# Patient Record
Sex: FEMALE | Race: WHITE | NOT HISPANIC OR LATINO | Employment: UNEMPLOYED | URBAN - METROPOLITAN AREA
[De-identification: names, ages, dates, MRNs, and addresses within clinical notes are randomized per-mention and may not be internally consistent; named-entity substitution may affect disease eponyms.]

---

## 2020-11-27 LAB
HPV ABSTRACT: NORMAL
PAP-ABSTRACT: NORMAL

## 2021-03-23 LAB
CHOLESTEROL (EXTERNAL): 182 MG/DL (ref 0–199)
HDLC SERPL-MCNC: 62 MG/DL
LDL CHOLESTEROL CALCULATED (EXTERNAL): 97 MG/DL
NON HDL CHOLESTEROL (EXTERNAL): 120 MG/DL
TRIGLYCERIDES (EXTERNAL): 116 MG/DL

## 2022-02-15 LAB
ALT SERPL-CCNC: 143 U/L (ref 0–55)
AST SERPL-CCNC: 142 U/L (ref 10–40)
HEP C HIM: NORMAL
TSH SERPL-ACNC: 1.48 UIU/ML (ref 0.3–4.5)

## 2022-04-21 ENCOUNTER — TRANSFERRED RECORDS (OUTPATIENT)
Dept: MULTI SPECIALTY CLINIC | Facility: CLINIC | Age: 47
End: 2022-04-21

## 2022-04-21 LAB
CREATININE (EXTERNAL): 0.92 MG/DL (ref 0.55–1.02)
GFR ESTIMATED (EXTERNAL): >60 ML/MIN/1.73M2
GLUCOSE (EXTERNAL): 101 MG/DL (ref 70–100)
POTASSIUM (EXTERNAL): 4.1 MMOL/L (ref 3.5–5.1)

## 2023-01-25 ENCOUNTER — VIRTUAL VISIT (OUTPATIENT)
Dept: FAMILY MEDICINE | Facility: CLINIC | Age: 48
End: 2023-01-25
Payer: COMMERCIAL

## 2023-01-25 DIAGNOSIS — F41.1 GAD (GENERALIZED ANXIETY DISORDER): ICD-10-CM

## 2023-01-25 DIAGNOSIS — J45.20 MILD INTERMITTENT ASTHMA WITHOUT COMPLICATION: ICD-10-CM

## 2023-01-25 DIAGNOSIS — K58.0 IRRITABLE BOWEL SYNDROME WITH DIARRHEA: ICD-10-CM

## 2023-01-25 DIAGNOSIS — G43.009 MIGRAINE WITHOUT AURA AND WITHOUT STATUS MIGRAINOSUS, NOT INTRACTABLE: Primary | ICD-10-CM

## 2023-01-25 PROBLEM — G43.109 MIGRAINE WITH AURA AND WITHOUT STATUS MIGRAINOSUS, NOT INTRACTABLE: Status: ACTIVE | Noted: 2023-01-25

## 2023-01-25 PROCEDURE — 99204 OFFICE O/P NEW MOD 45 MIN: CPT | Mod: 95 | Performed by: INTERNAL MEDICINE

## 2023-01-25 RX ORDER — ALBUTEROL SULFATE 90 UG/1
AEROSOL, METERED RESPIRATORY (INHALATION)
COMMUNITY
End: 2023-01-25

## 2023-01-25 RX ORDER — SUMATRIPTAN 20 MG/1
SPRAY NASAL
COMMUNITY

## 2023-01-25 RX ORDER — ESCITALOPRAM OXALATE 10 MG/1
10 TABLET ORAL DAILY
Qty: 90 TABLET | Refills: 1 | Status: SHIPPED | OUTPATIENT
Start: 2023-01-25 | End: 2023-07-21

## 2023-01-25 RX ORDER — ESCITALOPRAM OXALATE 20 MG/1
TABLET ORAL
Status: CANCELLED | OUTPATIENT
Start: 2023-01-25

## 2023-01-25 RX ORDER — ESCITALOPRAM OXALATE 20 MG/1
TABLET ORAL
COMMUNITY
Start: 2022-08-01 | End: 2023-01-25

## 2023-01-25 RX ORDER — ALBUTEROL SULFATE 90 UG/1
2 AEROSOL, METERED RESPIRATORY (INHALATION) EVERY 6 HOURS PRN
Qty: 18 G | Refills: 3 | Status: SHIPPED | OUTPATIENT
Start: 2023-01-25 | End: 2023-12-18

## 2023-01-25 NOTE — PATIENT INSTRUCTIONS
You are due for mammogram.  Please call the following number to make appointment :  [phone]  It is located in [address]

## 2023-01-25 NOTE — PROGRESS NOTES
Eryn is a 47 year old who is being evaluated via a billable video visit.      How would you like to obtain your AVS? MyChart  If the video visit is dropped, the invitation should be resent by: Text to cell phone: [phone]  Will anyone else be joining your video visit? No        Assessment & Plan     Migraine without aura and without status migrainosus, not intractable  Stable.  Continue sumatriptan as needed    Mild intermittent asthma without complication  Stable.  Medication refilled  - albuterol (PROAIR HFA/PROVENTIL HFA/VENTOLIN HFA) 108 (90 Base) MCG/ACT inhaler; Inhale 2 puffs into the lungs every 6 hours as needed for shortness of breath, wheezing or cough    MARIJA (generalized anxiety disorder)  Stable.  She would like to decrease the dose to 10 mg daily.  - escitalopram (LEXAPRO) 10 MG tablet; Take 1 tablet (10 mg) by mouth daily    Irritable bowel syndrome with diarrhea  Managed without medicines.       See Patient Instructions    Return in about 6 months (around 7/25/2023) for Follow up, Routine preventive, with me.    FRAN BOSE MD  [facility]   Eryn is a 47 year old, presenting for the following health issues:  No chief complaint on file.    Eryn is a very pleasant 47-year-old female who had a virtual visit to establish care.  She has generalized anxiety disorder and takes Lexapro 20 mg daily.  Mild intermittent asthma and migraine without aura.  Family history of coronary artery disease and cardiac arrest in father.  She started having colonoscopy at age 36 due to family hx of polyps in mom and colon cancer in paternal uncle.     History of Present Illness       Reason for visit:  Medication refills needed prior to scheduling annual physical    She eats 2-3 servings of fruits and vegetables daily.She consumes 0 sweetened beverage(s) daily.She exercises with enough effort to increase her heart rate 30 to 60 minutes per day.  She exercises with enough effort to  increase her heart rate 7 days per week.   She is taking medications regularly.       Review of Systems       Objective       Vitals:  No vitals were obtained today due to virtual visit.    Physical Exam   GEN: No acute distress  RESP: No audible increased work of breathing. Patient speaking in full sentences without distress.  PSYCH: pleasant  Exam otherwise limited due to virtual platform        Video-Visit Details    Type of service:  Video Visit     Originating Location (pt. Location): Home  Distant Location (provider location):  On-site  Platform [facility] for Video Visit: [facility]

## 2023-03-27 ENCOUNTER — OFFICE VISIT (OUTPATIENT)
Dept: URGENT CARE | Facility: URGENT CARE | Age: 48
End: 2023-03-27
Payer: COMMERCIAL

## 2023-03-27 VITALS
DIASTOLIC BLOOD PRESSURE: 98 MMHG | OXYGEN SATURATION: 96 % | TEMPERATURE: 97.7 F | WEIGHT: 243 LBS | HEART RATE: 92 BPM | SYSTOLIC BLOOD PRESSURE: 141 MMHG
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
## 2023-03-27 PROCEDURE — 99213 OFFICE O/P EST LOW 20 MIN: CPT

## 2023-03-27 NOTE — PATIENT INSTRUCTIONS
Keep the wound clean and dry and continue to apply bacitracin and Band-Aid.  Take the antibiotic as prescribed and finish the full course even if symptoms improve.  Try yogurt with active cultures or probiotics such as Culturelle daily to help prevent diarrhea while using antibiotics.  Monitor for any increase in redness, swelling, pain, drainage, warmth, fever/chills and/or nausea/vomiting.  Return to clinic should any of these occur.

## 2023-03-27 NOTE — PROGRESS NOTES
ASSESSMENT:  (W54.0XXA) Dog bite, initial encounter  (primary encounter diagnosis)  Plan: amoxicillin-clavulanate (AUGMENTIN) 875-125 MG         tablet    PLAN:  Bite, dog patient instructions discussed and provided.  We discussed the need to keep the wound clean and dry and continue to apply bacitracin and Band-Aid.  Informed the patient to take the antibiotic as prescribed and finish the full course even if symptoms improve.  We discussed trying yogurt with active cultures or probiotic such as Culturelle daily to help prevent diarrhea while taking the antibiotic.  Informed the patient to monitor for any increase in redness, swelling, pain, drainage, warmth, fever/chills and/or nausea/vomiting and the need to return to clinic should any of these occur.  Patient acknowledged her understanding of the above plan.    The use of Dragon/Moodswiingation services may have been used to construct the content in this note; any grammatical or spelling errors are non-intentional. Please contact the author of this note directly if you are in need of any clarification.      MANNY Muñiz CNP    SUBJECTIVE:  Eryn Conner is a 47 year old  female who presents with a chief complaint of an dog bite on the right hand.  Severity: moderate.  Animal's immunizations up to date   Associated symptoms: immediate pain, swelling and edema at site  last tetanus booster 1 year ago    ROS:  Negative except noted above.     OBJECTIVE:   GENERAL: healthy, alert no acute distress  SKIN: puncture wound of the dorsum of the right hand.  No bleeding or drainage noted.  Swelling around the site and tissue around the site warmer than surrounding tissue.  Limited ROM when making a fist due to pain and swelling

## 2023-04-16 ENCOUNTER — HEALTH MAINTENANCE LETTER (OUTPATIENT)
Age: 48
End: 2023-04-16

## 2023-12-17 DIAGNOSIS — J45.20 MILD INTERMITTENT ASTHMA WITHOUT COMPLICATION: ICD-10-CM

## 2023-12-18 ENCOUNTER — MYC REFILL (OUTPATIENT)
Dept: FAMILY MEDICINE | Facility: CLINIC | Age: 48
End: 2023-12-18
Payer: COMMERCIAL

## 2023-12-18 DIAGNOSIS — J45.20 MILD INTERMITTENT ASTHMA WITHOUT COMPLICATION: ICD-10-CM

## 2023-12-18 RX ORDER — ALBUTEROL SULFATE 90 UG/1
AEROSOL, METERED RESPIRATORY (INHALATION)
Qty: 18 G | Refills: 3 | OUTPATIENT
Start: 2023-12-18

## 2023-12-18 RX ORDER — ALBUTEROL SULFATE 90 UG/1
2 AEROSOL, METERED RESPIRATORY (INHALATION) EVERY 6 HOURS PRN
Qty: 18 G | Refills: 0 | Status: SHIPPED | OUTPATIENT
Start: 2023-12-18 | End: 2024-01-09

## 2024-01-09 DIAGNOSIS — J45.20 MILD INTERMITTENT ASTHMA WITHOUT COMPLICATION: ICD-10-CM

## 2024-01-09 RX ORDER — ALBUTEROL SULFATE 90 UG/1
AEROSOL, METERED RESPIRATORY (INHALATION)
Qty: 18 G | Refills: 0 | Status: SHIPPED | OUTPATIENT
Start: 2024-01-09 | End: 2024-02-05

## 2024-02-03 DIAGNOSIS — J45.20 MILD INTERMITTENT ASTHMA WITHOUT COMPLICATION: ICD-10-CM

## 2024-02-05 RX ORDER — ALBUTEROL SULFATE 90 UG/1
AEROSOL, METERED RESPIRATORY (INHALATION)
Qty: 18 G | Refills: 3 | Status: SHIPPED | OUTPATIENT
Start: 2024-02-05

## 2024-06-23 ENCOUNTER — HEALTH MAINTENANCE LETTER (OUTPATIENT)
Age: 49
End: 2024-06-23

## 2025-07-12 ENCOUNTER — HEALTH MAINTENANCE LETTER (OUTPATIENT)
Age: 50
End: 2025-07-12